# Patient Record
Sex: FEMALE | Race: WHITE | NOT HISPANIC OR LATINO | URBAN - METROPOLITAN AREA
[De-identification: names, ages, dates, MRNs, and addresses within clinical notes are randomized per-mention and may not be internally consistent; named-entity substitution may affect disease eponyms.]

---

## 2017-10-28 PROBLEM — Z00.00 ENCOUNTER FOR PREVENTIVE HEALTH EXAMINATION: Status: ACTIVE | Noted: 2017-10-28

## 2017-11-01 ENCOUNTER — OUTPATIENT (OUTPATIENT)
Dept: OUTPATIENT SERVICES | Facility: HOSPITAL | Age: 62
LOS: 1 days | End: 2017-11-01
Payer: COMMERCIAL

## 2017-11-01 VITALS
HEART RATE: 78 BPM | DIASTOLIC BLOOD PRESSURE: 80 MMHG | WEIGHT: 125.66 LBS | RESPIRATION RATE: 16 BRPM | SYSTOLIC BLOOD PRESSURE: 130 MMHG | HEIGHT: 63 IN | TEMPERATURE: 98 F

## 2017-11-01 DIAGNOSIS — N90.1 MODERATE VULVAR DYSPLASIA: ICD-10-CM

## 2017-11-01 DIAGNOSIS — Z98.890 OTHER SPECIFIED POSTPROCEDURAL STATES: Chronic | ICD-10-CM

## 2017-11-01 DIAGNOSIS — Z01.818 ENCOUNTER FOR OTHER PREPROCEDURAL EXAMINATION: ICD-10-CM

## 2017-11-01 DIAGNOSIS — J45.909 UNSPECIFIED ASTHMA, UNCOMPLICATED: ICD-10-CM

## 2017-11-01 DIAGNOSIS — Z90.89 ACQUIRED ABSENCE OF OTHER ORGANS: Chronic | ICD-10-CM

## 2017-11-01 LAB
ANION GAP SERPL CALC-SCNC: 14 MMOL/L — SIGNIFICANT CHANGE UP (ref 5–17)
BASOPHILS # BLD AUTO: 0 K/UL — SIGNIFICANT CHANGE UP (ref 0–0.2)
BASOPHILS NFR BLD AUTO: 0.2 % — SIGNIFICANT CHANGE UP (ref 0–2)
BLD GP AB SCN SERPL QL: SIGNIFICANT CHANGE UP
BUN SERPL-MCNC: 19 MG/DL — SIGNIFICANT CHANGE UP (ref 8–20)
CALCIUM SERPL-MCNC: 9.3 MG/DL — SIGNIFICANT CHANGE UP (ref 8.6–10.2)
CHLORIDE SERPL-SCNC: 101 MMOL/L — SIGNIFICANT CHANGE UP (ref 98–107)
CO2 SERPL-SCNC: 27 MMOL/L — SIGNIFICANT CHANGE UP (ref 22–29)
CREAT SERPL-MCNC: 0.58 MG/DL — SIGNIFICANT CHANGE UP (ref 0.5–1.3)
EOSINOPHIL # BLD AUTO: 0.4 K/UL — SIGNIFICANT CHANGE UP (ref 0–0.5)
EOSINOPHIL NFR BLD AUTO: 3.4 % — SIGNIFICANT CHANGE UP (ref 0–6)
GLUCOSE SERPL-MCNC: 99 MG/DL — SIGNIFICANT CHANGE UP (ref 70–115)
HCT VFR BLD CALC: 41.7 % — SIGNIFICANT CHANGE UP (ref 37–47)
HGB BLD-MCNC: 14 G/DL — SIGNIFICANT CHANGE UP (ref 12–16)
LYMPHOCYTES # BLD AUTO: 18.2 % — LOW (ref 20–55)
LYMPHOCYTES # BLD AUTO: 2.1 K/UL — SIGNIFICANT CHANGE UP (ref 1–4.8)
MCHC RBC-ENTMCNC: 31.4 PG — HIGH (ref 27–31)
MCHC RBC-ENTMCNC: 33.6 G/DL — SIGNIFICANT CHANGE UP (ref 32–36)
MCV RBC AUTO: 93.5 FL — SIGNIFICANT CHANGE UP (ref 81–99)
MONOCYTES # BLD AUTO: 0.8 K/UL — SIGNIFICANT CHANGE UP (ref 0–0.8)
MONOCYTES NFR BLD AUTO: 6.9 % — SIGNIFICANT CHANGE UP (ref 3–10)
NEUTROPHILS # BLD AUTO: 8.2 K/UL — HIGH (ref 1.8–8)
NEUTROPHILS NFR BLD AUTO: 71.1 % — SIGNIFICANT CHANGE UP (ref 37–73)
PLATELET # BLD AUTO: 270 K/UL — SIGNIFICANT CHANGE UP (ref 150–400)
POTASSIUM SERPL-MCNC: 4.1 MMOL/L — SIGNIFICANT CHANGE UP (ref 3.5–5.3)
POTASSIUM SERPL-SCNC: 4.1 MMOL/L — SIGNIFICANT CHANGE UP (ref 3.5–5.3)
RBC # BLD: 4.46 M/UL — SIGNIFICANT CHANGE UP (ref 4.4–5.2)
RBC # FLD: 12.4 % — SIGNIFICANT CHANGE UP (ref 11–15.6)
SODIUM SERPL-SCNC: 142 MMOL/L — SIGNIFICANT CHANGE UP (ref 135–145)
TYPE + AB SCN PNL BLD: SIGNIFICANT CHANGE UP
WBC # BLD: 11.6 K/UL — HIGH (ref 4.8–10.8)
WBC # FLD AUTO: 11.6 K/UL — HIGH (ref 4.8–10.8)

## 2017-11-01 PROCEDURE — 71020: CPT | Mod: 26

## 2017-11-01 PROCEDURE — 71046 X-RAY EXAM CHEST 2 VIEWS: CPT

## 2017-11-01 PROCEDURE — 85027 COMPLETE CBC AUTOMATED: CPT

## 2017-11-01 PROCEDURE — 36415 COLL VENOUS BLD VENIPUNCTURE: CPT

## 2017-11-01 PROCEDURE — 86901 BLOOD TYPING SEROLOGIC RH(D): CPT

## 2017-11-01 PROCEDURE — 86900 BLOOD TYPING SEROLOGIC ABO: CPT

## 2017-11-01 PROCEDURE — G0463: CPT

## 2017-11-01 PROCEDURE — 86850 RBC ANTIBODY SCREEN: CPT

## 2017-11-01 PROCEDURE — 80048 BASIC METABOLIC PNL TOTAL CA: CPT

## 2017-11-01 PROCEDURE — 93010 ELECTROCARDIOGRAM REPORT: CPT

## 2017-11-01 PROCEDURE — 93005 ELECTROCARDIOGRAM TRACING: CPT

## 2017-11-01 RX ORDER — SODIUM CHLORIDE 9 MG/ML
3 INJECTION INTRAMUSCULAR; INTRAVENOUS; SUBCUTANEOUS ONCE
Qty: 0 | Refills: 0 | Status: DISCONTINUED | OUTPATIENT
Start: 2017-11-28 | End: 2017-12-13

## 2017-11-01 NOTE — H&P PST ADULT - FAMILY HISTORY
Mother  Still living? No  Family history of melanoma, Age at diagnosis: 31-40     Father  Still living? No  Family history of MI (myocardial infarction), Age at diagnosis: 61-70

## 2017-11-01 NOTE — H&P PST ADULT - ATTENDING COMMENTS
Patient seen and examined.  Had a pneumonia since last office visit which forced her surgery to be rescheduled.  Has since been treated with antibiotics, has been medically cleared, and is without any pulmonary symptoms.

## 2017-11-01 NOTE — H&P PST ADULT - NSANTHOSAYNRD_GEN_A_CORE
No. MIKALA screening performed.  STOP BANG Legend: 0-2 = LOW Risk; 3-4 = INTERMEDIATE Risk; 5-8 = HIGH Risk

## 2017-11-21 ENCOUNTER — OUTPATIENT (OUTPATIENT)
Dept: OUTPATIENT SERVICES | Facility: HOSPITAL | Age: 62
LOS: 1 days | End: 2017-11-21
Payer: COMMERCIAL

## 2017-11-21 DIAGNOSIS — Z90.89 ACQUIRED ABSENCE OF OTHER ORGANS: Chronic | ICD-10-CM

## 2017-11-21 DIAGNOSIS — J18.9 PNEUMONIA, UNSPECIFIED ORGANISM: ICD-10-CM

## 2017-11-21 DIAGNOSIS — Z98.890 OTHER SPECIFIED POSTPROCEDURAL STATES: Chronic | ICD-10-CM

## 2017-11-21 PROCEDURE — 71020: CPT | Mod: 26

## 2017-11-21 PROCEDURE — 71046 X-RAY EXAM CHEST 2 VIEWS: CPT

## 2017-11-28 ENCOUNTER — OUTPATIENT (OUTPATIENT)
Dept: OUTPATIENT SERVICES | Facility: HOSPITAL | Age: 62
LOS: 1 days | End: 2017-11-28
Payer: COMMERCIAL

## 2017-11-28 ENCOUNTER — RESULT REVIEW (OUTPATIENT)
Age: 62
End: 2017-11-28

## 2017-11-28 VITALS
HEART RATE: 72 BPM | RESPIRATION RATE: 17 BRPM | SYSTOLIC BLOOD PRESSURE: 121 MMHG | DIASTOLIC BLOOD PRESSURE: 65 MMHG | OXYGEN SATURATION: 95 %

## 2017-11-28 VITALS
SYSTOLIC BLOOD PRESSURE: 125 MMHG | RESPIRATION RATE: 16 BRPM | HEIGHT: 63 IN | OXYGEN SATURATION: 98 % | TEMPERATURE: 98 F | DIASTOLIC BLOOD PRESSURE: 66 MMHG | WEIGHT: 123.9 LBS | HEART RATE: 74 BPM

## 2017-11-28 DIAGNOSIS — Z90.89 ACQUIRED ABSENCE OF OTHER ORGANS: Chronic | ICD-10-CM

## 2017-11-28 DIAGNOSIS — Z98.890 OTHER SPECIFIED POSTPROCEDURAL STATES: Chronic | ICD-10-CM

## 2017-11-28 DIAGNOSIS — N90.1 MODERATE VULVAR DYSPLASIA: ICD-10-CM

## 2017-11-28 PROCEDURE — 88309 TISSUE EXAM BY PATHOLOGIST: CPT

## 2017-11-28 PROCEDURE — 56620 VULVECTOMY SIMPLE PARTIAL: CPT

## 2017-11-28 PROCEDURE — 88309 TISSUE EXAM BY PATHOLOGIST: CPT | Mod: 26

## 2017-11-28 RX ORDER — FENTANYL CITRATE 50 UG/ML
50 INJECTION INTRAVENOUS
Qty: 0 | Refills: 0 | Status: DISCONTINUED | OUTPATIENT
Start: 2017-11-28 | End: 2017-11-28

## 2017-11-28 RX ORDER — OXYCODONE AND ACETAMINOPHEN 5; 325 MG/1; MG/1
1 TABLET ORAL
Qty: 12 | Refills: 0
Start: 2017-11-28 | End: 2017-12-01

## 2017-11-28 RX ORDER — ONDANSETRON 8 MG/1
4 TABLET, FILM COATED ORAL ONCE
Qty: 0 | Refills: 0 | Status: DISCONTINUED | OUTPATIENT
Start: 2017-11-28 | End: 2017-11-28

## 2017-11-28 RX ORDER — CEFAZOLIN SODIUM 1 G
2000 VIAL (EA) INJECTION ONCE
Qty: 0 | Refills: 0 | Status: COMPLETED | OUTPATIENT
Start: 2017-11-28 | End: 2017-11-28

## 2017-11-28 RX ORDER — FENTANYL CITRATE 50 UG/ML
25 INJECTION INTRAVENOUS
Qty: 0 | Refills: 0 | Status: DISCONTINUED | OUTPATIENT
Start: 2017-11-28 | End: 2017-11-28

## 2017-11-28 RX ORDER — SODIUM CHLORIDE 9 MG/ML
1000 INJECTION, SOLUTION INTRAVENOUS
Qty: 0 | Refills: 0 | Status: DISCONTINUED | OUTPATIENT
Start: 2017-11-28 | End: 2017-11-28

## 2017-11-28 RX ADMIN — Medication 100 MILLIGRAM(S): at 09:10

## 2017-11-28 NOTE — ASU DISCHARGE PLAN (ADULT/PEDIATRIC). - ITEMS TO FOLLOWUP WITH YOUR PHYSICIAN'S
Please follow-up with Dr. Cathy GARCIA in one week  Follow-up with Dr. Morgan in two weeks Please follow-up with Dr. Morgan next week

## 2017-11-28 NOTE — ASU DISCHARGE PLAN (ADULT/PEDIATRIC). - MEDICATION SUMMARY - MEDICATIONS TO TAKE
I will START or STAY ON the medications listed below when I get home from the hospital:    Percocet 5/325 oral tablet  -- 1 tab(s) by mouth every 6 hours, As Needed -for severe pain MDD:4 tabs   -- Caution federal law prohibits the transfer of this drug to any person other  than the person for whom it was prescribed.  May cause drowsiness.  Alcohol may intensify this effect.  Use care when operating dangerous machinery.  This prescription cannot be refilled.  This product contains acetaminophen.  Do not use  with any other product containing acetaminophen to prevent possible liver damage.  Using more of this medication than prescribed may cause serious breathing problems.    -- Indication: For pain    Advair Diskus 500 mcg-50 mcg inhalation powder  -- 1 puff(s) inhaled 2 times a day  -- Indication: For per PMD    Ventolin HFA 90 mcg/inh inhalation aerosol  -- 2 puff(s) inhaled 4 times a day, As Needed  -- Indication: For per PMD    Nasacort 55 mcg/inh nasal aerosol  -- nasal once a day  -- Indication: For per PMD    Probiotic Formula oral capsule  -- 1 cap(s) by mouth 2 times a day  -- Indication: For per PMD    Vitamin D3 2000 intl units oral tablet  -- 2 tab(s) by mouth 2 times a day  -- Indication: For per PMD

## 2017-11-28 NOTE — ASU DISCHARGE PLAN (ADULT/PEDIATRIC). - SPECIAL INSTRUCTIONS
Please take aleve 220 mg every 6 hours x 3 days  May take percocet as prescribed for breakthrough pain

## 2017-12-01 LAB — SURGICAL PATHOLOGY FINAL REPORT - CH: SIGNIFICANT CHANGE UP

## 2023-02-17 PROBLEM — J32.9 CHRONIC SINUSITIS, UNSPECIFIED: Chronic | Status: ACTIVE | Noted: 2017-11-01

## 2023-02-22 ENCOUNTER — APPOINTMENT (OUTPATIENT)
Dept: OBGYN | Facility: CLINIC | Age: 68
End: 2023-02-22
Payer: MEDICARE

## 2023-02-22 VITALS
WEIGHT: 127 LBS | DIASTOLIC BLOOD PRESSURE: 64 MMHG | HEIGHT: 63 IN | SYSTOLIC BLOOD PRESSURE: 110 MMHG | BODY MASS INDEX: 22.5 KG/M2

## 2023-02-22 DIAGNOSIS — Z85.828 PERSONAL HISTORY OF OTHER MALIGNANT NEOPLASM OF SKIN: ICD-10-CM

## 2023-02-22 DIAGNOSIS — J45.50 SEVERE PERSISTENT ASTHMA, UNCOMPLICATED: ICD-10-CM

## 2023-02-22 PROCEDURE — 99387 INIT PM E/M NEW PAT 65+ YRS: CPT | Mod: GY

## 2023-02-22 PROCEDURE — G0101: CPT

## 2023-03-01 LAB
C TRACH RRNA SPEC QL NAA+PROBE: NOT DETECTED
CYTOLOGY CVX/VAG DOC THIN PREP: ABNORMAL
HPV HIGH+LOW RISK DNA PNL CVX: NOT DETECTED
N GONORRHOEA RRNA SPEC QL NAA+PROBE: NOT DETECTED
SOURCE TP AMPLIFICATION: NORMAL

## 2023-03-07 ENCOUNTER — OFFICE (OUTPATIENT)
Dept: URBAN - METROPOLITAN AREA CLINIC 12 | Facility: CLINIC | Age: 68
Setting detail: OPHTHALMOLOGY
End: 2023-03-07
Payer: MEDICARE

## 2023-03-07 DIAGNOSIS — H16.223: ICD-10-CM

## 2023-03-07 DIAGNOSIS — H11.153: ICD-10-CM

## 2023-03-07 DIAGNOSIS — H25.13: ICD-10-CM

## 2023-03-07 DIAGNOSIS — H40.033: ICD-10-CM

## 2023-03-07 DIAGNOSIS — J01.90: ICD-10-CM

## 2023-03-07 DIAGNOSIS — H35.371: ICD-10-CM

## 2023-03-07 DIAGNOSIS — H43.811: ICD-10-CM

## 2023-03-07 DIAGNOSIS — H35.411: ICD-10-CM

## 2023-03-07 PROCEDURE — 92250 FUNDUS PHOTOGRAPHY W/I&R: CPT | Performed by: STUDENT IN AN ORGANIZED HEALTH CARE EDUCATION/TRAINING PROGRAM

## 2023-03-07 PROCEDURE — 92014 COMPRE OPH EXAM EST PT 1/>: CPT | Performed by: STUDENT IN AN ORGANIZED HEALTH CARE EDUCATION/TRAINING PROGRAM

## 2023-03-07 ASSESSMENT — REFRACTION_AUTOREFRACTION
OS_SPHERE: -0.50
OD_CYLINDER: -1.25
OS_AXIS: 148
OS_CYLINDER: -0.25
OD_AXIS: 178
OD_SPHERE: -1.25

## 2023-03-07 ASSESSMENT — CONFRONTATIONAL VISUAL FIELD TEST (CVF)
OS_FINDINGS: FULL
OD_FINDINGS: FULL

## 2023-03-07 ASSESSMENT — REFRACTION_MANIFEST
OD_AXIS: 180
OS_VA1: 20/30-2
OS_SPHERE: -0.50
OD_VA1: 20/25
OS_AXIS: 150
OD_SPHERE: -1.25
OD_CYLINDER: -1.00
OS_CYLINDER: -0.25
OD_AXIS: 180
OD_CYLINDER: -1.25
OD_SPHERE: -1.00
OD_ADD: +2.50
OS_SPHERE: -0.75
OS_ADD: +2.50
OS_VA1: 20/20-
OS_CYLINDER: SPH

## 2023-03-07 ASSESSMENT — REFRACTION_CURRENTRX
OD_VPRISM_DIRECTION: SV
OS_SPHERE: -0.25
OS_OVR_VA: 20/
OD_ADD: +2.50
OD_OVR_VA: 20/
OD_CYLINDER: -1.50
OS_CYLINDER: -0.50
OS_VPRISM_DIRECTION: SV
OD_OVR_VA: 20/
OD_CYLINDER: -1.25
OS_AXIS: 158
OS_VPRISM_DIRECTION: PROGS
OD_AXIS: 009
OS_OVR_VA: 20/
OD_SPHERE: PLANO
OD_SPHERE: PLANO
OD_VPRISM_DIRECTION: PROGS
OD_AXIS: 003
OS_SPHERE: -0.25
OS_CYLINDER: -0.50
OS_ADD: +2.50
OS_AXIS: 153

## 2023-03-07 ASSESSMENT — SPHEQUIV_DERIVED
OD_SPHEQUIV: -1.5
OS_SPHEQUIV: -0.625
OD_SPHEQUIV: -1.875
OS_SPHEQUIV: -0.625
OD_SPHEQUIV: -1.875

## 2023-03-07 ASSESSMENT — KERATOMETRY
OD_K1POWER_DIOPTERS: 45.00
OS_AXISANGLE_DEGREES: 108
OS_K2POWER_DIOPTERS: 46.25
OD_K2POWER_DIOPTERS: 46.50
OS_K1POWER_DIOPTERS: 46.00
OD_AXISANGLE_DEGREES: 077
METHOD_AUTO_MANUAL: AUTO

## 2023-03-07 ASSESSMENT — AXIALLENGTH_DERIVED
OS_AL: 22.8926
OD_AL: 23.4964
OD_AL: 23.4964
OS_AL: 22.8926
OD_AL: 23.3523

## 2023-03-07 ASSESSMENT — SUPERFICIAL PUNCTATE KERATITIS (SPK)
OD_SPK: 2+
OS_SPK: 2+

## 2023-03-07 ASSESSMENT — VISUAL ACUITY
OD_BCVA: 20/50+1
OS_BCVA: 20/30-2

## 2023-03-07 ASSESSMENT — TONOMETRY
OS_IOP_MMHG: 15
OD_IOP_MMHG: 17

## 2023-03-14 ENCOUNTER — APPOINTMENT (OUTPATIENT)
Dept: OBGYN | Facility: CLINIC | Age: 68
End: 2023-03-14
Payer: MEDICARE

## 2023-03-14 VITALS
BODY MASS INDEX: 22.5 KG/M2 | SYSTOLIC BLOOD PRESSURE: 110 MMHG | WEIGHT: 127 LBS | DIASTOLIC BLOOD PRESSURE: 60 MMHG | HEIGHT: 63 IN

## 2023-03-14 PROCEDURE — 99214 OFFICE O/P EST MOD 30 MIN: CPT

## 2023-03-14 NOTE — PHYSICAL EXAM
[Appropriately responsive] : appropriately responsive [Alert] : alert [No Acute Distress] : no acute distress [No Lymphadenopathy] : no lymphadenopathy [Regular Rate Rhythm] : regular rate rhythm [No Murmurs] : no murmurs [Clear to Auscultation B/L] : clear to auscultation bilaterally [Soft] : soft [Non-tender] : non-tender [Non-distended] : non-distended [No HSM] : No HSM [No Lesions] : no lesions [No Mass] : no mass [Oriented x3] : oriented x3 [Normal] : normal external genitalia [FreeTextEntry2] : [id number] cut to the left of ther clitoris. no mass

## 2023-03-14 NOTE — PLAN
[FreeTextEntry1] : advised pt that i did not see any mass and that it looked like she just had a cut from wiping. pt concerned as she had a staph infection in that area in the past. advised applying a petroleum based ointment to the area and rx for abx sent. f/u 1 week if not resolved or prn

## 2023-03-14 NOTE — HISTORY OF PRESENT ILLNESS
[FreeTextEntry1] : pt with history of vulvar cancer presents with concern of a lesion on her left vulva. states its been there around 2 weeks

## 2023-04-11 PROBLEM — H11.042 PTERYGIUM ; LEFT EYE: Status: ACTIVE | Noted: 2023-04-11

## 2023-04-17 NOTE — ASU PATIENT PROFILE, ADULT - BLOOD TRANSFUSION, PREVIOUS, PROFILE
no
Quality 226: Preventive Care And Screening: Tobacco Use: Screening And Cessation Intervention: Patient screened for tobacco use and is an ex/non-smoker
Detail Level: Detailed
Quality 130: Documentation Of Current Medications In The Medical Record: Current Medications Documented
Quality 110: Preventive Care And Screening: Influenza Immunization: Influenza Immunization previously received during influenza season

## 2023-07-28 ENCOUNTER — APPOINTMENT (OUTPATIENT)
Dept: OBGYN | Facility: CLINIC | Age: 68
End: 2023-07-28
Payer: MEDICARE

## 2023-07-28 ENCOUNTER — LABORATORY RESULT (OUTPATIENT)
Age: 68
End: 2023-07-28

## 2023-07-28 VITALS
HEIGHT: 63.5 IN | DIASTOLIC BLOOD PRESSURE: 68 MMHG | BODY MASS INDEX: 22.23 KG/M2 | WEIGHT: 127 LBS | SYSTOLIC BLOOD PRESSURE: 120 MMHG

## 2023-07-28 PROCEDURE — 56605 BIOPSY OF VULVA/PERINEUM: CPT

## 2023-07-28 NOTE — PROCEDURE
[Vulvar Biopsy] : Vulvar Biopsy [Time out performed] : Pre-procedure time out performed.  Patient's name, date of birth and procedure confirmed. [Consent Obtained] : Consent obtained [Size of Biopsy Taken: ___ (mm)] : [unfilled]Umm [Local Anesthesia] : local anesthesia [____ Lidocaine w/o Epi] : ~VmL lidocaine without epinephrine [Betadine] : Betadine [Sent to Pathology] : placed in buffered formalin and sent for pathology [Punch] : punch biopsy [Silver Nitrate] : silver nitrate [Tolerated Well] : the patient tolerated the procedure well

## 2023-08-02 ENCOUNTER — NON-APPOINTMENT (OUTPATIENT)
Age: 68
End: 2023-08-02

## 2023-08-02 DIAGNOSIS — C51.9 MALIGNANT NEOPLASM OF VULVA, UNSPECIFIED: ICD-10-CM

## 2023-08-09 ENCOUNTER — NON-APPOINTMENT (OUTPATIENT)
Age: 68
End: 2023-08-09

## 2023-08-10 ENCOUNTER — APPOINTMENT (OUTPATIENT)
Dept: GYNECOLOGIC ONCOLOGY | Facility: CLINIC | Age: 68
End: 2023-08-10
Payer: MEDICARE

## 2023-08-10 VITALS — WEIGHT: 127 LBS | HEIGHT: 63 IN | BODY MASS INDEX: 22.5 KG/M2

## 2023-08-10 DIAGNOSIS — J45.909 UNSPECIFIED ASTHMA, UNCOMPLICATED: ICD-10-CM

## 2023-08-10 DIAGNOSIS — D09.9 CARCINOMA IN SITU, UNSPECIFIED: ICD-10-CM

## 2023-08-10 DIAGNOSIS — Z80.8 FAMILY HISTORY OF MALIGNANT NEOPLASM OF OTHER ORGANS OR SYSTEMS: ICD-10-CM

## 2023-08-10 DIAGNOSIS — Z87.891 PERSONAL HISTORY OF NICOTINE DEPENDENCE: ICD-10-CM

## 2023-08-10 DIAGNOSIS — N90.89 OTHER SPECIFIED NONINFLAMMATORY DISORDERS OF VULVA AND PERINEUM: ICD-10-CM

## 2023-08-10 DIAGNOSIS — E78.5 HYPERLIPIDEMIA, UNSPECIFIED: ICD-10-CM

## 2023-08-10 DIAGNOSIS — Z01.419 ENCOUNTER FOR GYNECOLOGICAL EXAMINATION (GENERAL) (ROUTINE) W/OUT ABNORMAL FINDINGS: ICD-10-CM

## 2023-08-10 DIAGNOSIS — M81.0 AGE-RELATED OSTEOPOROSIS W/OUT CURRENT PATHOLOGICAL FRACTURE: ICD-10-CM

## 2023-08-10 DIAGNOSIS — Z78.9 OTHER SPECIFIED HEALTH STATUS: ICD-10-CM

## 2023-08-10 PROCEDURE — 99204 OFFICE O/P NEW MOD 45 MIN: CPT

## 2023-08-11 PROBLEM — M81.0 OSTEOPOROSIS: Status: ACTIVE | Noted: 2023-08-11

## 2023-08-11 PROBLEM — N90.89 VULVAR LESION: Status: ACTIVE | Noted: 2023-03-14

## 2023-08-11 PROBLEM — J45.909 ASTHMA: Status: ACTIVE | Noted: 2023-08-11

## 2023-08-11 PROBLEM — Z80.8 FAMILY HISTORY OF MALIGNANT MELANOMA: Status: ACTIVE | Noted: 2023-08-11

## 2023-08-11 PROBLEM — Z78.9 DENIES ALCOHOL CONSUMPTION: Status: ACTIVE | Noted: 2023-08-11

## 2023-08-11 PROBLEM — Z01.419 WELL WOMAN EXAM WITH ROUTINE GYNECOLOGICAL EXAM: Status: RESOLVED | Noted: 2023-02-22 | Resolved: 2023-08-11

## 2023-08-11 PROBLEM — E78.5 HYPERLIPIDEMIA: Status: ACTIVE | Noted: 2023-08-11

## 2023-08-11 PROBLEM — Z87.891 FORMER SMOKER: Status: ACTIVE | Noted: 2023-08-11

## 2023-08-11 PROBLEM — D09.9 SQUAMOUS CELL CARCINOMA IN SITU: Status: ACTIVE | Noted: 2023-08-11

## 2023-08-11 RX ORDER — TIOTROPIUM BROMIDE INHALATION SPRAY 1.56 UG/1
SPRAY, METERED RESPIRATORY (INHALATION)
Refills: 0 | Status: ACTIVE | COMMUNITY

## 2023-08-11 RX ORDER — ATORVASTATIN CALCIUM 80 MG/1
TABLET, FILM COATED ORAL
Refills: 0 | Status: ACTIVE | COMMUNITY

## 2023-08-11 RX ORDER — ALBUTEROL SULFATE 90 UG/1
AEROSOL, METERED RESPIRATORY (INHALATION)
Refills: 0 | Status: ACTIVE | COMMUNITY

## 2023-08-11 RX ORDER — FLUTICASONE PROPION/SALMETEROL 500-50 MCG
500-50 BLISTER, WITH INHALATION DEVICE INHALATION
Refills: 0 | Status: ACTIVE | COMMUNITY

## 2023-08-11 RX ORDER — SULFAMETHOXAZOLE AND TRIMETHOPRIM 400; 80 MG/1; MG/1
400-80 TABLET ORAL TWICE DAILY
Qty: 14 | Refills: 0 | Status: DISCONTINUED | COMMUNITY
Start: 2023-03-14 | End: 2023-08-11

## 2023-08-16 NOTE — HISTORY OF PRESENT ILLNESS
[FreeTextEntry1] : 67 yo nulligravida LMP around age 53 referred by Dr. Dann Larsen, presents to office for evaluation of vulvar pre-cancer. Patient has a history of vulvar squamous cell carcinoma dating back to 2017. 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 No adjuvant treatment was recommended. Patient admits she had a gap in her care thereafter until this year. She presented to her GYN in 3/2023 with concern of left vulva lesion x2 weeks. Patient underwent left vulvar biopsy on 7/28/23. Pathology revealed squamous cell carcinoma in situ with glandular involvement. She reports some discomfort at the biopsy site, but denied any itching or bleeding of the lesion prior.  PCP: Dr. Apolinar Alonso Pulm: Dr. Talat Lara  Last pap: WNL, negative HPV Last mammo: 12/2022- BR2 Last colo: Never Last DEXA: 12/2021- Osteoporosis

## 2023-08-16 NOTE — CHIEF COMPLAINT
[FreeTextEntry1] : [facility] [address] [place] [id number] [phone]  Vulvar pre-cancer

## 2023-08-16 NOTE — PLAN
[TextEntry] : Wide local excision of left vulva Consent signed in office Slide release signed in office Medical clearance required by PCP Surgical coordinator to reach out regarding scheduling (prefers [facility], 8/25 due to work schedule) PST to be completed -- CBC, BMP, TYPE & SCREEN, PT/PTT/INR, EKG

## 2023-08-16 NOTE — PHYSICAL EXAM
[Chaperone Present] : A chaperone was present in the examining room during all aspects of the physical examination [Normal] : Mood and affect: Normal [Abnormal] : External genitalia: Abnormal [Fully active, able to carry on all pre-disease performance without restriction] : Status 0 - Fully active, able to carry on all pre-disease performance without restriction [FreeTextEntry1] : Name of chaperone: Leilani Cadet PA-C. [de-identified] : 1cm biopsy site noted left vulva, in close proximity to clitoris

## 2023-08-16 NOTE — ASSESSMENT
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  I discussed with the patient that this diagnosis is a pre-cancer, rather than a fully developed cancer. I discussed that the treatment of choice is a wide local excision of the area to ensure all abnormal cells are adequately removed with negative margins. Given the close proximity to her clitoris, I do not recommend this procedure to be performed in the office but rather in the hospital or surgery center. I discussed with her the risks and benefits of wide local excision of left vulva. The patient understands the risks to include (but not be limited to) infection, pain, bleeding, etc. She agrees to proceed, although will be seeking a second opinion from [facility]. She will contact our office should she decide to move forward with care at [facility].  All questions and concerns were answered to patient's apparent satisfaction.

## 2023-08-16 NOTE — END OF VISIT
[FreeTextEntry3] : I, Dr. Danielle French, personally performed the evaluation and management (E/M) services for this new patient. That E/M includes conducting the initial examination, assessing all conditions, and establishing the plan of care. Today, Leilani Cadet PA-C, was here to observe my evaluation and management services for this patient to be followed going forward.

## 2023-08-22 ENCOUNTER — OUTPATIENT (OUTPATIENT)
Dept: OUTPATIENT SERVICES | Facility: HOSPITAL | Age: 68
LOS: 1 days | End: 2023-08-22
Payer: MEDICARE

## 2023-08-22 DIAGNOSIS — Z90.89 ACQUIRED ABSENCE OF OTHER ORGANS: Chronic | ICD-10-CM

## 2023-08-22 DIAGNOSIS — Z01.818 ENCOUNTER FOR OTHER PREPROCEDURAL EXAMINATION: ICD-10-CM

## 2023-08-22 DIAGNOSIS — Z98.890 OTHER SPECIFIED POSTPROCEDURAL STATES: Chronic | ICD-10-CM

## 2023-08-22 LAB
A1C WITH ESTIMATED AVERAGE GLUCOSE RESULT: 5 % — SIGNIFICANT CHANGE UP (ref 4–5.6)
ANION GAP SERPL CALC-SCNC: 10 MMOL/L — SIGNIFICANT CHANGE UP (ref 5–17)
BASOPHILS # BLD AUTO: 0.06 K/UL — SIGNIFICANT CHANGE UP (ref 0–0.2)
BASOPHILS NFR BLD AUTO: 0.6 % — SIGNIFICANT CHANGE UP (ref 0–2)
BUN SERPL-MCNC: 15.6 MG/DL — SIGNIFICANT CHANGE UP (ref 8–20)
CALCIUM SERPL-MCNC: 9.5 MG/DL — SIGNIFICANT CHANGE UP (ref 8.4–10.5)
CHLORIDE SERPL-SCNC: 101 MMOL/L — SIGNIFICANT CHANGE UP (ref 96–108)
CO2 SERPL-SCNC: 27 MMOL/L — SIGNIFICANT CHANGE UP (ref 22–29)
CREAT SERPL-MCNC: 0.55 MG/DL — SIGNIFICANT CHANGE UP (ref 0.5–1.3)
EGFR: 101 ML/MIN/1.73M2 — SIGNIFICANT CHANGE UP
EOSINOPHIL # BLD AUTO: 0.23 K/UL — SIGNIFICANT CHANGE UP (ref 0–0.5)
EOSINOPHIL NFR BLD AUTO: 2.3 % — SIGNIFICANT CHANGE UP (ref 0–6)
ESTIMATED AVERAGE GLUCOSE: 97 MG/DL — SIGNIFICANT CHANGE UP (ref 68–114)
GLUCOSE SERPL-MCNC: 104 MG/DL — HIGH (ref 70–99)
HCT VFR BLD CALC: 40.5 % — SIGNIFICANT CHANGE UP (ref 34.5–45)
HGB BLD-MCNC: 13.7 G/DL — SIGNIFICANT CHANGE UP (ref 11.5–15.5)
IMM GRANULOCYTES NFR BLD AUTO: 1.1 % — HIGH (ref 0–0.9)
LYMPHOCYTES # BLD AUTO: 1.77 K/UL — SIGNIFICANT CHANGE UP (ref 1–3.3)
LYMPHOCYTES # BLD AUTO: 18.1 % — SIGNIFICANT CHANGE UP (ref 13–44)
MCHC RBC-ENTMCNC: 31.9 PG — SIGNIFICANT CHANGE UP (ref 27–34)
MCHC RBC-ENTMCNC: 33.8 GM/DL — SIGNIFICANT CHANGE UP (ref 32–36)
MCV RBC AUTO: 94.2 FL — SIGNIFICANT CHANGE UP (ref 80–100)
MONOCYTES # BLD AUTO: 0.79 K/UL — SIGNIFICANT CHANGE UP (ref 0–0.9)
MONOCYTES NFR BLD AUTO: 8.1 % — SIGNIFICANT CHANGE UP (ref 2–14)
NEUTROPHILS # BLD AUTO: 6.84 K/UL — SIGNIFICANT CHANGE UP (ref 1.8–7.4)
NEUTROPHILS NFR BLD AUTO: 69.8 % — SIGNIFICANT CHANGE UP (ref 43–77)
PLATELET # BLD AUTO: 275 K/UL — SIGNIFICANT CHANGE UP (ref 150–400)
POTASSIUM SERPL-MCNC: 4.1 MMOL/L — SIGNIFICANT CHANGE UP (ref 3.5–5.3)
POTASSIUM SERPL-SCNC: 4.1 MMOL/L — SIGNIFICANT CHANGE UP (ref 3.5–5.3)
RBC # BLD: 4.3 M/UL — SIGNIFICANT CHANGE UP (ref 3.8–5.2)
RBC # FLD: 12.4 % — SIGNIFICANT CHANGE UP (ref 10.3–14.5)
SODIUM SERPL-SCNC: 138 MMOL/L — SIGNIFICANT CHANGE UP (ref 135–145)
WBC # BLD: 9.8 K/UL — SIGNIFICANT CHANGE UP (ref 3.8–10.5)
WBC # FLD AUTO: 9.8 K/UL — SIGNIFICANT CHANGE UP (ref 3.8–10.5)

## 2023-08-22 PROCEDURE — 83036 HEMOGLOBIN GLYCOSYLATED A1C: CPT

## 2023-08-22 PROCEDURE — 80048 BASIC METABOLIC PNL TOTAL CA: CPT

## 2023-08-22 PROCEDURE — 93010 ELECTROCARDIOGRAM REPORT: CPT

## 2023-08-22 PROCEDURE — 93005 ELECTROCARDIOGRAM TRACING: CPT

## 2023-08-22 PROCEDURE — G0463: CPT

## 2023-08-22 PROCEDURE — 36415 COLL VENOUS BLD VENIPUNCTURE: CPT

## 2023-08-22 PROCEDURE — 85025 COMPLETE CBC W/AUTO DIFF WBC: CPT

## 2023-09-01 ENCOUNTER — TRANSCRIPTION ENCOUNTER (OUTPATIENT)
Age: 68
End: 2023-09-01

## 2023-09-01 ENCOUNTER — OUTPATIENT (OUTPATIENT)
Dept: INPATIENT UNIT | Facility: HOSPITAL | Age: 68
LOS: 1 days | Discharge: HOME | End: 2023-09-01
Payer: MEDICARE

## 2023-09-01 ENCOUNTER — RESULT REVIEW (OUTPATIENT)
Age: 68
End: 2023-09-01

## 2023-09-01 VITALS
OXYGEN SATURATION: 98 % | RESPIRATION RATE: 16 BRPM | HEART RATE: 64 BPM | TEMPERATURE: 98 F | DIASTOLIC BLOOD PRESSURE: 81 MMHG | HEIGHT: 63 IN | SYSTOLIC BLOOD PRESSURE: 145 MMHG | WEIGHT: 126.99 LBS

## 2023-09-01 VITALS
OXYGEN SATURATION: 95 % | TEMPERATURE: 98 F | RESPIRATION RATE: 18 BRPM | SYSTOLIC BLOOD PRESSURE: 127 MMHG | HEART RATE: 76 BPM | DIASTOLIC BLOOD PRESSURE: 62 MMHG

## 2023-09-01 DIAGNOSIS — K21.9 GASTRO-ESOPHAGEAL REFLUX DISEASE WITHOUT ESOPHAGITIS: ICD-10-CM

## 2023-09-01 DIAGNOSIS — N90.3 DYSPLASIA OF VULVA, UNSPECIFIED: ICD-10-CM

## 2023-09-01 DIAGNOSIS — J45.909 UNSPECIFIED ASTHMA, UNCOMPLICATED: ICD-10-CM

## 2023-09-01 DIAGNOSIS — I25.10 ATHEROSCLEROTIC HEART DISEASE OF NATIVE CORONARY ARTERY WITHOUT ANGINA PECTORIS: ICD-10-CM

## 2023-09-01 DIAGNOSIS — D07.1 CARCINOMA IN SITU OF VULVA: ICD-10-CM

## 2023-09-01 DIAGNOSIS — Z87.891 PERSONAL HISTORY OF NICOTINE DEPENDENCE: ICD-10-CM

## 2023-09-01 DIAGNOSIS — M81.0 AGE-RELATED OSTEOPOROSIS WITHOUT CURRENT PATHOLOGICAL FRACTURE: ICD-10-CM

## 2023-09-01 DIAGNOSIS — Z90.89 ACQUIRED ABSENCE OF OTHER ORGANS: Chronic | ICD-10-CM

## 2023-09-01 DIAGNOSIS — E78.5 HYPERLIPIDEMIA, UNSPECIFIED: Chronic | ICD-10-CM

## 2023-09-01 DIAGNOSIS — K21.9 GASTRO-ESOPHAGEAL REFLUX DISEASE WITHOUT ESOPHAGITIS: Chronic | ICD-10-CM

## 2023-09-01 DIAGNOSIS — E78.5 HYPERLIPIDEMIA, UNSPECIFIED: ICD-10-CM

## 2023-09-01 DIAGNOSIS — Z98.890 OTHER SPECIFIED POSTPROCEDURAL STATES: Chronic | ICD-10-CM

## 2023-09-01 PROCEDURE — 56620 VULVECTOMY SIMPLE PARTIAL: CPT | Mod: 80

## 2023-09-01 PROCEDURE — 88305 TISSUE EXAM BY PATHOLOGIST: CPT

## 2023-09-01 PROCEDURE — 56620 VULVECTOMY SIMPLE PARTIAL: CPT

## 2023-09-01 PROCEDURE — 88305 TISSUE EXAM BY PATHOLOGIST: CPT | Mod: 26

## 2023-09-01 RX ORDER — ACETAMINOPHEN 500 MG
2 TABLET ORAL
Qty: 30 | Refills: 0
Start: 2023-09-01 | End: 2023-09-05

## 2023-09-01 RX ORDER — ROSUVASTATIN CALCIUM 5 MG/1
1 TABLET ORAL
Refills: 0 | DISCHARGE

## 2023-09-01 RX ORDER — TIOTROPIUM BROMIDE 18 UG/1
2 CAPSULE ORAL; RESPIRATORY (INHALATION)
Refills: 0 | DISCHARGE

## 2023-09-01 RX ORDER — ONDANSETRON 8 MG/1
4 TABLET, FILM COATED ORAL ONCE
Refills: 0 | Status: DISCONTINUED | OUTPATIENT
Start: 2023-09-01 | End: 2023-09-01

## 2023-09-01 RX ORDER — TRIAMCINOLONE ACETONIDE 55 MCG
0 AEROSOL, SPRAY (GRAM) NASAL
Qty: 0 | Refills: 0 | DISCHARGE

## 2023-09-01 RX ORDER — DUPILUMAB 300 MG/2ML
300 INJECTION, SOLUTION SUBCUTANEOUS
Refills: 0 | DISCHARGE

## 2023-09-01 RX ORDER — FLUTICASONE PROPIONATE AND SALMETEROL 50; 250 UG/1; UG/1
1 POWDER ORAL; RESPIRATORY (INHALATION)
Qty: 0 | Refills: 0 | DISCHARGE

## 2023-09-01 RX ORDER — OXYCODONE HYDROCHLORIDE 5 MG/1
5 TABLET ORAL ONCE
Refills: 0 | Status: DISCONTINUED | OUTPATIENT
Start: 2023-09-01 | End: 2023-09-01

## 2023-09-01 RX ORDER — FENTANYL CITRATE 50 UG/ML
25 INJECTION INTRAVENOUS
Refills: 0 | Status: DISCONTINUED | OUTPATIENT
Start: 2023-09-01 | End: 2023-09-01

## 2023-09-01 RX ORDER — FAMOTIDINE 10 MG/ML
1 INJECTION INTRAVENOUS
Refills: 0 | DISCHARGE

## 2023-09-01 RX ORDER — SODIUM CHLORIDE 9 MG/ML
1000 INJECTION, SOLUTION INTRAVENOUS
Refills: 0 | Status: DISCONTINUED | OUTPATIENT
Start: 2023-09-01 | End: 2023-09-01

## 2023-09-01 RX ORDER — CETIRIZINE HYDROCHLORIDE 10 MG/1
1 TABLET ORAL
Refills: 0 | DISCHARGE

## 2023-09-01 RX ORDER — ALBUTEROL 90 UG/1
2 AEROSOL, METERED ORAL
Qty: 0 | Refills: 0 | DISCHARGE

## 2023-09-01 RX ORDER — CHOLECALCIFEROL (VITAMIN D3) 125 MCG
2 CAPSULE ORAL
Qty: 0 | Refills: 0 | DISCHARGE

## 2023-09-01 RX ORDER — L.ACIDOPH/B.ANIMALIS/B.LONGUM 15B CELL
1 CAPSULE ORAL
Qty: 0 | Refills: 0 | DISCHARGE

## 2023-09-01 NOTE — ASU DISCHARGE PLAN (ADULT/PEDIATRIC) - CARE PROVIDER_API CALL
Danielle French  Gynecologic Oncology  [address] [place] [id number]  Phone: [phone]  Fax: [phone]  Established Patient  Follow Up Time: 2 weeks

## 2023-09-01 NOTE — ASU DISCHARGE PLAN (ADULT/PEDIATRIC) - NS MD DC FALL RISK RISK
For information on Fall & Injury Prevention, visit: [email or web address] OR  [email or web address] OR  https://www.cdc.gov/steadi/patient.html

## 2023-09-01 NOTE — ASU PATIENT PROFILE, ADULT - NSICDXPASTMEDICALHX_GEN_ALL_CORE_FT
PAST MEDICAL HISTORY:  Asthma     Chronic sinusitis, unspecified location     Osteoporosis, Post-Menopausal

## 2023-09-01 NOTE — ASU PATIENT PROFILE, ADULT - NSICDXPASTSURGICALHX_GEN_ALL_CORE_FT
PAST SURGICAL HISTORY:  GERD (gastroesophageal reflux disease)     H/O umbilical hernia repair     HLD (hyperlipidemia)     S/P Arthroscopic Knee Surgery L x 2    S/P sinus surgery x 3    S/P tonsillectomy

## 2023-09-01 NOTE — ASU DISCHARGE PLAN (ADULT/PEDIATRIC) - ASU DC SPECIAL INSTRUCTIONSFT
A PA will call you in 1 week to follow up on post operative recovery.  Follow up with Dr. French in office in 2-3 weeks.    May walk and climb stairs as often as you'd like, no vigorous activity, do not lift anything greater than 10lbs, nothing per vagina x 2 weeks, do not drive while on pain medication.    CALL the DOCTOR:    -  If you have  not urinated 8 hours after surgery or have any difficulty urinating.   A responsible adult should be with you for the rest of the day and night for your safety and to help you if you needed.  Review post surgery FACT SHEET.  Begin with light meals today, increase fluid intake

## 2023-09-01 NOTE — BRIEF OPERATIVE NOTE - OPERATION/FINDINGS
hx of right vulva surgery. At 1 o'clock position acetic acid staining of a flat 1.5cm lesion under clitoris. 6 o'clock of vulva near hymen stained with acetic acid, flat lesion 2-3cm in size.

## 2023-09-01 NOTE — ASU PATIENT PROFILE, ADULT - FALL HARM RISK - UNIVERSAL INTERVENTIONS
Bed in lowest position, wheels locked, appropriate side rails in place/Call bell, personal items and telephone in reach/Instruct patient to call for assistance before getting out of bed or chair/Non-slip footwear when patient is out of bed/[place] to call system/Physically safe environment - no spills, clutter or unnecessary equipment/Purposeful Proactive Rounding/Room/bathroom lighting operational, light cord in reach

## 2023-09-06 LAB — SURGICAL PATHOLOGY STUDY: SIGNIFICANT CHANGE UP

## 2023-09-19 ENCOUNTER — OFFICE (OUTPATIENT)
Dept: URBAN - METROPOLITAN AREA CLINIC 12 | Facility: CLINIC | Age: 68
Setting detail: OPHTHALMOLOGY
End: 2023-09-19
Payer: MEDICARE

## 2023-09-19 VITALS — HEIGHT: 55 IN

## 2023-09-19 DIAGNOSIS — H11.153: ICD-10-CM

## 2023-09-19 DIAGNOSIS — H35.411: ICD-10-CM

## 2023-09-19 DIAGNOSIS — H25.13: ICD-10-CM

## 2023-09-19 DIAGNOSIS — H43.811: ICD-10-CM

## 2023-09-19 DIAGNOSIS — H35.371: ICD-10-CM

## 2023-09-19 DIAGNOSIS — H16.223: ICD-10-CM

## 2023-09-19 DIAGNOSIS — H40.033: ICD-10-CM

## 2023-09-19 DIAGNOSIS — J01.90: ICD-10-CM

## 2023-09-19 PROCEDURE — 92014 COMPRE OPH EXAM EST PT 1/>: CPT | Performed by: STUDENT IN AN ORGANIZED HEALTH CARE EDUCATION/TRAINING PROGRAM

## 2023-09-19 PROCEDURE — 92134 CPTRZ OPH DX IMG PST SGM RTA: CPT | Performed by: STUDENT IN AN ORGANIZED HEALTH CARE EDUCATION/TRAINING PROGRAM

## 2023-09-19 ASSESSMENT — TEAR BREAK UP TIME (TBUT)
OD_TBUT: 1-2 SEC
OS_TBUT: 3 SEC

## 2023-09-19 ASSESSMENT — SPHEQUIV_DERIVED
OS_SPHEQUIV: -0.5
OD_SPHEQUIV: -1.75
OD_SPHEQUIV: -1.5
OD_SPHEQUIV: -1.75
OS_SPHEQUIV: -0.5

## 2023-09-19 ASSESSMENT — REFRACTION_CURRENTRX
OS_VPRISM_DIRECTION: PROGS
OD_ADD: +2.25
OD_VPRISM_DIRECTION: SV
OD_OVR_VA: 20/
OD_SPHERE: PLANO
OS_AXIS: 158
OS_CYLINDER: -0.50
OD_CYLINDER: -1.25
OS_ADD: +2.25
OS_SPHERE: -0.25
OS_OVR_VA: 20/
OS_SPHERE: PLANO
OS_AXIS: 148
OD_VPRISM_DIRECTION: PROGS
OS_VPRISM_DIRECTION: SV
OD_AXIS: 017
OS_CYLINDER: -0.50
OS_OVR_VA: 20/
OD_CYLINDER: -1.50
OD_OVR_VA: 20/
OD_SPHERE: PLANO
OD_AXIS: 009

## 2023-09-19 ASSESSMENT — REFRACTION_AUTOREFRACTION
OD_SPHERE: -1.00
OD_AXIS: 004
OS_CYLINDER: -0.50
OS_SPHERE: -0.25
OD_CYLINDER: -1.50
OS_AXIS: 151

## 2023-09-19 ASSESSMENT — KERATOMETRY
OS_K2POWER_DIOPTERS: 46.00
OD_K1POWER_DIOPTERS: 44.75
OS_AXISANGLE_DEGREES: 112
OS_K1POWER_DIOPTERS: 45.75
OD_AXISANGLE_DEGREES: 082
OD_K2POWER_DIOPTERS: 46.00
METHOD_AUTO_MANUAL: AUTO

## 2023-09-19 ASSESSMENT — REFRACTION_MANIFEST
OS_VA1: 20/20-
OS_SPHERE: -0.75
OS_ADD: +2.50
OD_VA1: 20/40
OS_SPHERE: -0.25
OD_ADD: +2.50
OD_SPHERE: -1.00
OD_CYLINDER: -1.00
OD_AXIS: 005
OS_VA1: 20/30
OD_AXIS: 180
OS_CYLINDER: SPH
OD_SPHERE: -1.00
OD_CYLINDER: -1.50
OS_AXIS: 151
OD_VA1: 20/25
OS_CYLINDER: -0.50

## 2023-09-19 ASSESSMENT — SUPERFICIAL PUNCTATE KERATITIS (SPK)
OD_SPK: 2+
OS_SPK: 2+

## 2023-09-19 ASSESSMENT — VISUAL ACUITY
OD_BCVA: 20/40-1
OS_BCVA: 20/30

## 2023-09-19 ASSESSMENT — AXIALLENGTH_DERIVED
OD_AL: 23.4881
OD_AL: 23.585
OS_AL: 22.9333
OD_AL: 23.585
OS_AL: 22.9333

## 2023-09-19 ASSESSMENT — CONFRONTATIONAL VISUAL FIELD TEST (CVF)
OS_FINDINGS: FULL
OD_FINDINGS: FULL

## 2023-09-19 ASSESSMENT — TONOMETRY: OD_IOP_MMHG: 20

## 2023-09-20 ENCOUNTER — APPOINTMENT (OUTPATIENT)
Dept: GYNECOLOGIC ONCOLOGY | Facility: CLINIC | Age: 68
End: 2023-09-20
Payer: MEDICARE

## 2023-09-20 PROCEDURE — 99024 POSTOP FOLLOW-UP VISIT: CPT

## 2023-10-07 ENCOUNTER — OFFICE (OUTPATIENT)
Dept: URBAN - METROPOLITAN AREA CLINIC 1 | Facility: CLINIC | Age: 68
Setting detail: OPHTHALMOLOGY
End: 2023-10-07
Payer: MEDICARE

## 2023-10-07 DIAGNOSIS — H00.025: ICD-10-CM

## 2023-10-07 PROBLEM — H40.033 H/O NARROW ANGLE ; BOTH EYES: Status: ACTIVE | Noted: 2023-09-19

## 2023-10-07 PROCEDURE — 99213 OFFICE O/P EST LOW 20 MIN: CPT

## 2023-10-07 ASSESSMENT — REFRACTION_CURRENTRX
OD_AXIS: 009
OD_CYLINDER: -1.50
OS_CYLINDER: -0.50
OD_ADD: +2.25
OS_SPHERE: PLANO
OS_OVR_VA: 20/
OD_OVR_VA: 20/
OD_VPRISM_DIRECTION: PROGS
OS_ADD: +2.25
OD_CYLINDER: -1.25
OS_CYLINDER: -0.50
OS_VPRISM_DIRECTION: SV
OD_OVR_VA: 20/
OS_AXIS: 148
OD_SPHERE: PLANO
OS_OVR_VA: 20/
OD_AXIS: 017
OS_SPHERE: -0.25
OS_VPRISM_DIRECTION: PROGS
OS_AXIS: 158
OD_VPRISM_DIRECTION: SV
OD_SPHERE: PLANO

## 2023-10-07 ASSESSMENT — AXIALLENGTH_DERIVED
OS_AL: 22.9794
OD_AL: 23.5364
OD_AL: 23.4881
OD_AL: 23.585
OS_AL: 22.9333

## 2023-10-07 ASSESSMENT — REFRACTION_MANIFEST
OD_ADD: +2.50
OD_AXIS: 005
OS_CYLINDER: SPH
OS_VA1: 20/20-
OS_AXIS: 151
OD_VA1: 20/25
OD_CYLINDER: -1.00
OS_VA1: 20/30
OS_CYLINDER: -0.50
OD_AXIS: 180
OD_VA1: 20/40
OD_CYLINDER: -1.50
OD_SPHERE: -1.00
OS_ADD: +2.50
OD_SPHERE: -1.00
OS_SPHERE: -0.25
OS_SPHERE: -0.75

## 2023-10-07 ASSESSMENT — SPHEQUIV_DERIVED
OD_SPHEQUIV: -1.625
OS_SPHEQUIV: -0.5
OD_SPHEQUIV: -1.5
OS_SPHEQUIV: -0.625
OD_SPHEQUIV: -1.75

## 2023-10-07 ASSESSMENT — KERATOMETRY
METHOD_AUTO_MANUAL: AUTO
OS_K1POWER_DIOPTERS: 45.75
OS_AXISANGLE_DEGREES: 112
OD_AXISANGLE_DEGREES: 082
OS_K2POWER_DIOPTERS: 46.00
OD_K2POWER_DIOPTERS: 46.00
OD_K1POWER_DIOPTERS: 44.75

## 2023-10-07 ASSESSMENT — TEAR BREAK UP TIME (TBUT)
OD_TBUT: 1-2 SEC
OS_TBUT: 3 SEC

## 2023-10-07 ASSESSMENT — REFRACTION_AUTOREFRACTION
OD_AXIS: 2
OD_SPHERE: -0.75
OD_CYLINDER: -1.75
OS_CYLINDER: -0.25
OS_AXIS: 103
OS_SPHERE: -0.50

## 2023-10-07 ASSESSMENT — CONFRONTATIONAL VISUAL FIELD TEST (CVF)
OD_FINDINGS: FULL
OS_FINDINGS: FULL

## 2023-10-07 ASSESSMENT — SUPERFICIAL PUNCTATE KERATITIS (SPK)
OD_SPK: 2+
OS_SPK: 2+

## 2023-10-07 ASSESSMENT — TONOMETRY
OS_IOP_MMHG: 10
OD_IOP_MMHG: 10

## 2023-10-07 ASSESSMENT — VISUAL ACUITY
OD_BCVA: 20/40
OS_BCVA: 20/25

## 2023-11-10 PROBLEM — H11.042 PTERYGIUM ; LEFT EYE: Status: ACTIVE | Noted: 2023-11-10

## 2024-03-13 ENCOUNTER — OFFICE (OUTPATIENT)
Dept: URBAN - METROPOLITAN AREA CLINIC 12 | Facility: CLINIC | Age: 69
Setting detail: OPHTHALMOLOGY
End: 2024-03-13
Payer: MEDICARE

## 2024-03-13 DIAGNOSIS — H52.7: ICD-10-CM

## 2024-03-13 DIAGNOSIS — H35.411: ICD-10-CM

## 2024-03-13 DIAGNOSIS — H11.153: ICD-10-CM

## 2024-03-13 DIAGNOSIS — J01.90: ICD-10-CM

## 2024-03-13 DIAGNOSIS — H43.811: ICD-10-CM

## 2024-03-13 DIAGNOSIS — H16.223: ICD-10-CM

## 2024-03-13 DIAGNOSIS — H25.13: ICD-10-CM

## 2024-03-13 DIAGNOSIS — H35.371: ICD-10-CM

## 2024-03-13 DIAGNOSIS — H40.033: ICD-10-CM

## 2024-03-13 PROCEDURE — 92250 FUNDUS PHOTOGRAPHY W/I&R: CPT | Performed by: STUDENT IN AN ORGANIZED HEALTH CARE EDUCATION/TRAINING PROGRAM

## 2024-03-13 PROCEDURE — 92014 COMPRE OPH EXAM EST PT 1/>: CPT | Performed by: STUDENT IN AN ORGANIZED HEALTH CARE EDUCATION/TRAINING PROGRAM

## 2024-03-14 ASSESSMENT — REFRACTION_CURRENTRX
OD_OVR_VA: 20/
OS_CYLINDER: -0.50
OD_SPHERE: PLANO
OD_VPRISM_DIRECTION: SV
OS_ADD: +2.25
OD_AXIS: 017
OS_AXIS: 158
OS_OVR_VA: 20/
OS_CYLINDER: -0.50
OD_AXIS: 009
OD_CYLINDER: -1.50
OS_VPRISM_DIRECTION: PROGS
OS_OVR_VA: 20/
OS_SPHERE: -0.25
OD_VPRISM_DIRECTION: PROGS
OD_SPHERE: PLANO
OS_AXIS: 148
OD_OVR_VA: 20/
OD_CYLINDER: -1.25
OD_ADD: +2.25
OS_SPHERE: PLANO
OS_VPRISM_DIRECTION: SV

## 2024-03-14 ASSESSMENT — REFRACTION_MANIFEST
OD_VA1: 20/30
OD_CYLINDER: -1.25
OS_SPHERE: -0.50
OD_SPHERE: -1.00
OS_CYLINDER: SPH
OD_AXIS: 180
OD_VA1: 20/25
OS_VA1: 20/20-
OD_CYLINDER: -1.00
OS_AXIS: 120
OS_SPHERE: -0.75
OS_ADD: +2.50
OD_SPHERE: -0.50
OD_ADD: +2.50
OS_CYLINDER: -0.50
OD_AXIS: 010
OS_VA1: 20/25-2

## 2024-03-14 ASSESSMENT — SPHEQUIV_DERIVED
OS_SPHEQUIV: -0.75
OD_SPHEQUIV: -1.5
OD_SPHEQUIV: -1.125

## 2024-03-22 PROBLEM — H11.042 PTERYGIUM ; LEFT EYE: Status: ACTIVE | Noted: 2024-03-22

## 2024-05-23 ENCOUNTER — APPOINTMENT (OUTPATIENT)
Dept: GYNECOLOGIC ONCOLOGY | Facility: CLINIC | Age: 69
End: 2024-05-23
Payer: MEDICARE

## 2024-05-23 VITALS
WEIGHT: 119 LBS | DIASTOLIC BLOOD PRESSURE: 68 MMHG | BODY MASS INDEX: 21.09 KG/M2 | HEIGHT: 63 IN | SYSTOLIC BLOOD PRESSURE: 112 MMHG

## 2024-05-23 PROCEDURE — 99212 OFFICE O/P EST SF 10 MIN: CPT

## 2024-05-23 PROCEDURE — G2211 COMPLEX E/M VISIT ADD ON: CPT

## 2024-05-23 NOTE — REASON FOR VISIT
[FreeTextEntry1] : [facility] [place]. [phone]

## 2024-05-23 NOTE — END OF VISIT
[FreeTextEntry3] : RTC in 6 months for vulvar exam [FreeTextEntry2] :  RAJAN Linn was present the entire duration of the patient interaction and gynecological exam.

## 2024-05-23 NOTE — HISTORY OF PRESENT ILLNESS
[FreeTextEntry1] : 70 yo pt is s/p Vulva wide local excision x2 on 09/01/2023. 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 No evidence of invasive disease. Patient reports to the office today for a vulvar exam. She fell at home over her  and broke her right arm. She had surgery and now recovering. No new gynecologic issues. No vulvar itching, burning. She is not using any clobetasol.

## 2024-05-23 NOTE — PHYSICAL EXAM
[Chaperone Present] : A chaperone was present in the examining room during all aspects of the physical examination [[id number]] : A chaperone was present during the pelvic exam. [FreeTextEntry2] :  RAJAN Linn was present the entire duration of the patient interaction and gynecological exam. [Normal] : Recto-Vaginal Exam: Normal [de-identified] : well healed vulva on sites of surgery, no new leukoplakia or raise lesions, no abnormal vasculature, no discoloration, no nodularity [de-identified] : soft, non-tender [de-identified] : slight surrounding darker/ida pigmentation circumferencially around anus [Restricted in physically strenuous activity but ambulatory and able to carry out work of a light or sedentary nature] : Status 1- Restricted in physically strenuous activity but ambulatory and able to carry out work of a light or sedentary nature, e.g., light house work, office work

## 2024-05-23 NOTE — ASSESSMENT
[FreeTextEntry1] : 68 yo pt is s/p Vulva wide local excision x2 on 09/01/2023.  No concern for recurrent vulvar dysplasia today. Will re-evaluate again in 6 months if negative will recommend routine gynecologic care with yearly skin exams.

## 2024-06-18 PROBLEM — H11.041 PTERYGIUM; RIGHT EYE: Status: ACTIVE | Noted: 2024-06-18

## 2024-09-03 ENCOUNTER — OFFICE (OUTPATIENT)
Dept: URBAN - METROPOLITAN AREA CLINIC 12 | Facility: CLINIC | Age: 69
Setting detail: OPHTHALMOLOGY
End: 2024-09-03
Payer: MEDICARE

## 2024-09-03 DIAGNOSIS — J01.90: ICD-10-CM

## 2024-09-03 DIAGNOSIS — H11.153: ICD-10-CM

## 2024-09-03 DIAGNOSIS — H16.223: ICD-10-CM

## 2024-09-03 DIAGNOSIS — H25.13: ICD-10-CM

## 2024-09-03 DIAGNOSIS — H40.033: ICD-10-CM

## 2024-09-03 PROCEDURE — 92020 GONIOSCOPY: CPT | Performed by: STUDENT IN AN ORGANIZED HEALTH CARE EDUCATION/TRAINING PROGRAM

## 2024-09-03 PROCEDURE — 68761 CLOSE TEAR DUCT OPENING: CPT | Mod: 50 | Performed by: STUDENT IN AN ORGANIZED HEALTH CARE EDUCATION/TRAINING PROGRAM

## 2024-09-03 PROCEDURE — 99213 OFFICE O/P EST LOW 20 MIN: CPT | Mod: 25 | Performed by: STUDENT IN AN ORGANIZED HEALTH CARE EDUCATION/TRAINING PROGRAM

## 2024-09-03 ASSESSMENT — CONFRONTATIONAL VISUAL FIELD TEST (CVF)
OD_FINDINGS: FULL
OS_FINDINGS: FULL

## 2024-10-29 PROBLEM — H11.041 PTERYGIUM; RIGHT EYE: Status: ACTIVE | Noted: 2024-10-29

## 2024-11-14 ENCOUNTER — APPOINTMENT (OUTPATIENT)
Dept: GYNECOLOGIC ONCOLOGY | Facility: CLINIC | Age: 69
End: 2024-11-14
Payer: MEDICARE

## 2024-11-14 VITALS
SYSTOLIC BLOOD PRESSURE: 120 MMHG | HEIGHT: 63 IN | DIASTOLIC BLOOD PRESSURE: 72 MMHG | WEIGHT: 123 LBS | BODY MASS INDEX: 21.79 KG/M2

## 2024-11-14 DIAGNOSIS — D07.1 CARCINOMA IN SITU OF VULVA: ICD-10-CM

## 2024-11-14 PROCEDURE — 99214 OFFICE O/P EST MOD 30 MIN: CPT | Mod: 25

## 2024-11-14 PROCEDURE — 57420 EXAM OF VAGINA W/SCOPE: CPT | Mod: 59

## 2024-11-14 RX ORDER — IMIQUIMOD 50 MG/G
5 CREAM TOPICAL
Qty: 11 | Refills: 5 | Status: ACTIVE | COMMUNITY
Start: 2024-11-14 | End: 1900-01-01

## 2025-01-16 ENCOUNTER — APPOINTMENT (OUTPATIENT)
Dept: GYNECOLOGIC ONCOLOGY | Facility: CLINIC | Age: 70
End: 2025-01-16

## 2025-01-16 VITALS
BODY MASS INDEX: 21.79 KG/M2 | HEIGHT: 63 IN | DIASTOLIC BLOOD PRESSURE: 70 MMHG | WEIGHT: 123 LBS | SYSTOLIC BLOOD PRESSURE: 130 MMHG

## 2025-01-16 DIAGNOSIS — D07.1 CARCINOMA IN SITU OF VULVA: ICD-10-CM

## 2025-01-16 DIAGNOSIS — C51.9 MALIGNANT NEOPLASM OF VULVA, UNSPECIFIED: ICD-10-CM

## 2025-01-16 PROCEDURE — 99214 OFFICE O/P EST MOD 30 MIN: CPT

## 2025-01-16 PROCEDURE — 99459 PELVIC EXAMINATION: CPT

## 2025-01-16 PROCEDURE — G2211 COMPLEX E/M VISIT ADD ON: CPT

## 2025-03-04 ENCOUNTER — OFFICE (OUTPATIENT)
Dept: URBAN - METROPOLITAN AREA CLINIC 12 | Facility: CLINIC | Age: 70
Setting detail: OPHTHALMOLOGY
End: 2025-03-04
Payer: MEDICARE

## 2025-03-04 DIAGNOSIS — H16.223: ICD-10-CM

## 2025-03-04 DIAGNOSIS — H16.222: ICD-10-CM

## 2025-03-04 DIAGNOSIS — H40.033: ICD-10-CM

## 2025-03-04 DIAGNOSIS — H25.13: ICD-10-CM

## 2025-03-04 DIAGNOSIS — H43.811: ICD-10-CM

## 2025-03-04 DIAGNOSIS — H35.371: ICD-10-CM

## 2025-03-04 DIAGNOSIS — H35.411: ICD-10-CM

## 2025-03-04 DIAGNOSIS — Q14.1: ICD-10-CM

## 2025-03-04 PROBLEM — H16.221 DRY EYE SYNDROME K SICCA; RIGHT EYE, LEFT EYE, BOTH EYES: Status: ACTIVE | Noted: 2025-03-04

## 2025-03-04 PROCEDURE — 68761 CLOSE TEAR DUCT OPENING: CPT | Mod: 50 | Performed by: STUDENT IN AN ORGANIZED HEALTH CARE EDUCATION/TRAINING PROGRAM

## 2025-03-04 PROCEDURE — 92014 COMPRE OPH EXAM EST PT 1/>: CPT | Mod: 25 | Performed by: STUDENT IN AN ORGANIZED HEALTH CARE EDUCATION/TRAINING PROGRAM

## 2025-03-04 PROCEDURE — 92250 FUNDUS PHOTOGRAPHY W/I&R: CPT | Performed by: STUDENT IN AN ORGANIZED HEALTH CARE EDUCATION/TRAINING PROGRAM

## 2025-03-04 PROCEDURE — 68761 CLOSE TEAR DUCT OPENING: CPT | Mod: LT | Performed by: STUDENT IN AN ORGANIZED HEALTH CARE EDUCATION/TRAINING PROGRAM

## 2025-03-04 ASSESSMENT — REFRACTION_CURRENTRX
OS_VPRISM_DIRECTION: PROGS
OD_OVR_VA: 20/
OD_SPHERE: PLANO
OS_AXIS: 158
OD_SPHERE: -0.25
OD_ADD: +2.50
OD_OVR_VA: 20/
OD_AXIS: 9
OS_CYLINDER: -0.50
OD_VPRISM_DIRECTION: SV
OS_ADD: +2.50
OD_AXIS: 009
OD_CYLINDER: -1.25
OS_CYLINDER: -0.50
OS_OVR_VA: 20/
OS_OVR_VA: 20/
OS_SPHERE: -0.25
OS_AXIS: 154
OS_VPRISM_DIRECTION: SV
OD_CYLINDER: -1.50
OD_VPRISM_DIRECTION: PROGS
OS_SPHERE: -0.25

## 2025-03-04 ASSESSMENT — CONFRONTATIONAL VISUAL FIELD TEST (CVF)
OD_FINDINGS: FULL
OS_FINDINGS: FULL

## 2025-03-04 ASSESSMENT — REFRACTION_AUTOREFRACTION
OD_AXIS: 179
OS_AXIS: 119
OS_CYLINDER: -0.75
OD_SPHERE: -1.50
OD_CYLINDER: -1.25
OS_SPHERE: -0.25

## 2025-03-04 ASSESSMENT — REFRACTION_MANIFEST
OD_AXIS: 180
OD_VA1: 20/30
OS_AXIS: 124
OS_VA1: 20/25-2
OD_ADD: +2.50
OS_ADD: +2.50
OS_VA1: 20/20-
OS_SPHERE: -0.75
OD_SPHERE: -1.50
OD_VA1: 20/25
OD_AXIS: 172
OS_SPHERE: +0.25
OS_CYLINDER: SPH
OS_CYLINDER: -1.00
OD_SPHERE: -1.00
OD_CYLINDER: -1.00
OD_CYLINDER: -1.00

## 2025-03-04 ASSESSMENT — TONOMETRY
OS_IOP_MMHG: 19
OD_IOP_MMHG: 18

## 2025-03-04 ASSESSMENT — KERATOMETRY
OD_K1POWER_DIOPTERS: 45.00
OS_K2POWER_DIOPTERS: 46.00
OS_AXISANGLE_DEGREES: 90
OD_K2POWER_DIOPTERS: 46.00
METHOD_AUTO_MANUAL: AUTO
OD_AXISANGLE_DEGREES: 73
OS_K1POWER_DIOPTERS: 46.00

## 2025-03-04 ASSESSMENT — VISUAL ACUITY
OD_BCVA: 20/40-1
OS_BCVA: 20/40

## 2025-03-04 ASSESSMENT — TEAR BREAK UP TIME (TBUT)
OD_TBUT: 1-2 SEC
OS_TBUT: 3 SEC

## 2025-03-04 ASSESSMENT — SUPERFICIAL PUNCTATE KERATITIS (SPK)
OD_SPK: 2+
OS_SPK: 2+

## 2025-03-13 ENCOUNTER — APPOINTMENT (OUTPATIENT)
Dept: GYNECOLOGIC ONCOLOGY | Facility: CLINIC | Age: 70
End: 2025-03-13
Payer: MEDICARE

## 2025-03-13 PROCEDURE — 99213 OFFICE O/P EST LOW 20 MIN: CPT

## 2025-03-13 PROCEDURE — G2211 COMPLEX E/M VISIT ADD ON: CPT

## 2025-03-13 PROCEDURE — 99459 PELVIC EXAMINATION: CPT

## 2025-04-07 NOTE — ASU PATIENT PROFILE, ADULT - NSCAFFEINETYPE_GEN_ALL_CORE_SD
[Time Spent: ___ minutes] : I have spent [unfilled] minutes of time on the encounter which excludes teaching and separately reported services.
coffee

## 2025-06-28 NOTE — PHYSICAL EXAM

## 2025-09-18 ENCOUNTER — APPOINTMENT (OUTPATIENT)
Dept: GYNECOLOGIC ONCOLOGY | Facility: CLINIC | Age: 70
End: 2025-09-18
Payer: MEDICARE

## 2025-09-18 VITALS — BODY MASS INDEX: 22.32 KG/M2 | WEIGHT: 126 LBS | HEIGHT: 63 IN

## 2025-09-18 DIAGNOSIS — C51.9 MALIGNANT NEOPLASM OF VULVA, UNSPECIFIED: ICD-10-CM

## 2025-09-18 PROCEDURE — 56820 COLPOSCOPY VULVA: CPT
